# Patient Record
Sex: FEMALE | Race: WHITE | NOT HISPANIC OR LATINO | Employment: UNEMPLOYED | URBAN - METROPOLITAN AREA
[De-identification: names, ages, dates, MRNs, and addresses within clinical notes are randomized per-mention and may not be internally consistent; named-entity substitution may affect disease eponyms.]

---

## 2020-09-24 ENCOUNTER — HOSPITAL ENCOUNTER (EMERGENCY)
Facility: HOSPITAL | Age: 35
Discharge: HOME | End: 2020-09-24
Attending: EMERGENCY MEDICINE
Payer: MEDICAID

## 2020-09-24 VITALS
SYSTOLIC BLOOD PRESSURE: 105 MMHG | DIASTOLIC BLOOD PRESSURE: 72 MMHG | HEART RATE: 72 BPM | RESPIRATION RATE: 16 BRPM | TEMPERATURE: 98 F | OXYGEN SATURATION: 97 % | WEIGHT: 120 LBS

## 2020-09-24 DIAGNOSIS — T40.601A OPIATE OVERDOSE, ACCIDENTAL OR UNINTENTIONAL, INITIAL ENCOUNTER: Primary | ICD-10-CM

## 2020-09-24 LAB
ALBUMIN SERPL BCP-MCNC: 3.8 G/DL (ref 3.5–5.2)
ALP SERPL-CCNC: 72 U/L (ref 55–135)
ALT SERPL W/O P-5'-P-CCNC: 13 U/L (ref 10–44)
AMORPH CRY URNS QL MICRO: NORMAL
ANION GAP SERPL CALC-SCNC: 11 MMOL/L (ref 8–16)
AST SERPL-CCNC: 28 U/L (ref 10–40)
BACTERIA #/AREA URNS HPF: NORMAL /HPF
BASOPHILS # BLD AUTO: 0.04 K/UL (ref 0–0.2)
BASOPHILS NFR BLD: 0.7 % (ref 0–1.9)
BILIRUB SERPL-MCNC: 0.4 MG/DL (ref 0.1–1)
BILIRUB UR QL STRIP: NEGATIVE
BUN SERPL-MCNC: 9 MG/DL (ref 6–20)
CALCIUM SERPL-MCNC: 8.7 MG/DL (ref 8.7–10.5)
CHLORIDE SERPL-SCNC: 105 MMOL/L (ref 95–110)
CLARITY UR: CLEAR
CO2 SERPL-SCNC: 25 MMOL/L (ref 23–29)
COLOR UR: ABNORMAL
CREAT SERPL-MCNC: 0.8 MG/DL (ref 0.5–1.4)
DIFFERENTIAL METHOD: NORMAL
EOSINOPHIL # BLD AUTO: 0.3 K/UL (ref 0–0.5)
EOSINOPHIL NFR BLD: 4.2 % (ref 0–8)
ERYTHROCYTE [DISTWIDTH] IN BLOOD BY AUTOMATED COUNT: 13.7 % (ref 11.5–14.5)
EST. GFR  (AFRICAN AMERICAN): >60 ML/MIN/1.73 M^2
EST. GFR  (NON AFRICAN AMERICAN): >60 ML/MIN/1.73 M^2
GLUCOSE SERPL-MCNC: 68 MG/DL (ref 70–110)
GLUCOSE SERPL-MCNC: 72 MG/DL (ref 70–110)
GLUCOSE UR QL STRIP: ABNORMAL
HCT VFR BLD AUTO: 38.2 % (ref 37–48.5)
HGB BLD-MCNC: 12.3 G/DL (ref 12–16)
HGB UR QL STRIP: ABNORMAL
IMM GRANULOCYTES # BLD AUTO: 0.03 K/UL (ref 0–0.04)
IMM GRANULOCYTES NFR BLD AUTO: 0.5 % (ref 0–0.5)
KETONES UR QL STRIP: NEGATIVE
LEUKOCYTE ESTERASE UR QL STRIP: NEGATIVE
LYMPHOCYTES # BLD AUTO: 2.6 K/UL (ref 1–4.8)
LYMPHOCYTES NFR BLD: 43.4 % (ref 18–48)
MCH RBC QN AUTO: 29.6 PG (ref 27–31)
MCHC RBC AUTO-ENTMCNC: 32.2 G/DL (ref 32–36)
MCV RBC AUTO: 92 FL (ref 82–98)
MICROSCOPIC COMMENT: NORMAL
MONOCYTES # BLD AUTO: 0.4 K/UL (ref 0.3–1)
MONOCYTES NFR BLD: 6.9 % (ref 4–15)
NEUTROPHILS # BLD AUTO: 2.6 K/UL (ref 1.8–7.7)
NEUTROPHILS NFR BLD: 44.3 % (ref 38–73)
NITRITE UR QL STRIP: NEGATIVE
NRBC BLD-RTO: 0 /100 WBC
PH UR STRIP: 6 [PH] (ref 5–8)
PLATELET # BLD AUTO: 258 K/UL (ref 150–350)
PMV BLD AUTO: 10.5 FL (ref 9.2–12.9)
POCT GLUCOSE: 68 MG/DL (ref 70–110)
POTASSIUM SERPL-SCNC: 3.8 MMOL/L (ref 3.5–5.1)
PROT SERPL-MCNC: 7.1 G/DL (ref 6–8.4)
PROT UR QL STRIP: NEGATIVE
RBC # BLD AUTO: 4.16 M/UL (ref 4–5.4)
RBC #/AREA URNS HPF: 2 /HPF (ref 0–4)
SODIUM SERPL-SCNC: 141 MMOL/L (ref 136–145)
SP GR UR STRIP: 1 (ref 1–1.03)
SQUAMOUS #/AREA URNS HPF: NORMAL /HPF
URN SPEC COLLECT METH UR: ABNORMAL
UROBILINOGEN UR STRIP-ACNC: NEGATIVE EU/DL
WBC # BLD AUTO: 5.92 K/UL (ref 3.9–12.7)
WBC #/AREA URNS HPF: 2 /HPF (ref 0–5)

## 2020-09-24 PROCEDURE — 81000 URINALYSIS NONAUTO W/SCOPE: CPT

## 2020-09-24 PROCEDURE — 99284 EMERGENCY DEPT VISIT MOD MDM: CPT | Mod: 25

## 2020-09-24 PROCEDURE — 80053 COMPREHEN METABOLIC PANEL: CPT

## 2020-09-24 PROCEDURE — 82962 GLUCOSE BLOOD TEST: CPT

## 2020-09-24 PROCEDURE — 96374 THER/PROPH/DIAG INJ IV PUSH: CPT

## 2020-09-24 PROCEDURE — 85025 COMPLETE CBC W/AUTO DIFF WBC: CPT

## 2020-09-24 PROCEDURE — 96361 HYDRATE IV INFUSION ADD-ON: CPT

## 2020-09-24 PROCEDURE — 25000003 PHARM REV CODE 250: Performed by: PHYSICIAN ASSISTANT

## 2020-09-24 RX ORDER — DEXTROSE 50 % IN WATER (D50W) INTRAVENOUS SYRINGE
25
Status: COMPLETED | OUTPATIENT
Start: 2020-09-24 | End: 2020-09-24

## 2020-09-24 RX ORDER — NALOXONE HYDROCHLORIDE 4 MG/.1ML
SPRAY NASAL
Qty: 1 EACH | Refills: 11 | Status: SHIPPED | OUTPATIENT
Start: 2020-09-24

## 2020-09-24 RX ADMIN — SODIUM CHLORIDE 1000 ML: 0.9 INJECTION, SOLUTION INTRAVENOUS at 07:09

## 2020-09-24 RX ADMIN — DEXTROSE MONOHYDRATE 25 G: 25 INJECTION, SOLUTION INTRAVENOUS at 07:09

## 2020-09-25 NOTE — ED PROVIDER NOTES
Encounter Date: 9/24/2020    SCRIBE #1 NOTE: I, Darren Valentino, am scribing for, and in the presence of,  JOSÉ LUIS Pacheco. I have scribed the following portions of the note - Other sections scribed: HPI,ROS.       History     Chief Complaint   Patient presents with    Drug Overdose     heroin OD. 2mg narcan IN, 1mg IV. Pt is alert to touch. 98%RA     34 y.o. female with past medical history of heroin use presenting via EMS after being found unresponsive today. Patient was given 2mg of narcan IN and 1mg IV. Upon arrival, pt is easily arousable. Patient states she drunk a 6 pack of beers and was on her way to buy more but does not remember what happened. Patient notes she used to use heroin and feels like she did, but is unsure if she used today. She denies fever, chills, body aches, chest pain, shortness of breath, abdominal pain, nausea, or vomiting.     The history is provided by the patient and the EMS personnel. No  was used.     Review of patient's allergies indicates:   Allergen Reactions    Aspirin      anaphasics     Sulfamethoxazole-trimethoprim      History reviewed. No pertinent past medical history.  History reviewed. No pertinent surgical history.  History reviewed. No pertinent family history.  Social History     Tobacco Use    Smoking status: Unknown If Ever Smoked   Substance Use Topics    Alcohol use: Not on file    Drug use: Not on file     Review of Systems   Constitutional: Negative for chills and fever.   HENT: Negative for congestion and sore throat.    Eyes: Negative for visual disturbance.   Respiratory: Negative for cough and shortness of breath.    Cardiovascular: Negative for chest pain.   Gastrointestinal: Negative for abdominal pain, diarrhea, nausea and vomiting.   Genitourinary: Negative for dysuria and vaginal discharge.   Musculoskeletal: Negative for myalgias.   Skin: Negative for rash.   Neurological: Negative for headaches.   Psychiatric/Behavioral:  Negative for decreased concentration.       Physical Exam     Initial Vitals   BP Pulse Resp Temp SpO2   09/24/20 1902 09/24/20 1902 09/24/20 1902 09/24/20 2212 09/24/20 1902   107/73 70 16 98.2 °F (36.8 °C) 99 %      MAP       --                Physical Exam    Nursing note and vitals reviewed.  HENT:   Head: Normocephalic and atraumatic.   Mouth/Throat: No oropharyngeal exudate.   Eyes: No scleral icterus.   Neck: Normal range of motion. Neck supple. No JVD present.   Cardiovascular: Normal rate, regular rhythm, normal heart sounds and intact distal pulses.   Pulmonary/Chest: Breath sounds normal. No respiratory distress. She has no wheezes. She has no rales.   Abdominal: Soft. Bowel sounds are normal. She exhibits no distension. There is no abdominal tenderness. There is no guarding.   Musculoskeletal: Normal range of motion. No tenderness or edema.   Neurological: She has normal strength. No cranial nerve deficit or sensory deficit.   Awake but drowsy   Skin: Skin is warm and dry. Capillary refill takes less than 2 seconds. No rash noted. No erythema. No pallor.   Psychiatric: Her behavior is normal.         ED Course   Procedures  Labs Reviewed   URINALYSIS - Abnormal; Notable for the following components:       Result Value    Glucose, UA 1+ (*)     Occult Blood UA 1+ (*)     All other components within normal limits   POCT GLUCOSE - Abnormal; Notable for the following components:    POCT Glucose 68 (*)     All other components within normal limits   COMPREHENSIVE METABOLIC PANEL   CBC W/ AUTO DIFFERENTIAL   URINALYSIS MICROSCOPIC   POCT GLUCOSE MONITORING CONTINUOUS          Imaging Results          X-Ray Chest AP Portable (Final result)  Result time 09/24/20 20:02:09    Final result by Jt Foy MD (09/24/20 20:02:09)                 Impression:      No acute cardiopulmonary process identified.      Electronically signed by: Jt Foy MD  Date:    09/24/2020  Time:    20:02              Narrative:    EXAMINATION:  XR CHEST AP PORTABLE    CLINICAL HISTORY:  overdose;    TECHNIQUE:  Single frontal view of the chest was performed.    COMPARISON:  None    FINDINGS:  Cardiac silhouette is normal in size.  Lungs are symmetrically expanded.  No evidence of focal consolidative process, pneumothorax, or significant pleural effusion.  No acute osseous abnormality identified.                                            Scribe Attestation:   Scribe #1: I performed the above scribed service and the documentation accurately describes the services I performed. I attest to the accuracy of the note.    I attest that I personally performed the services documented by the scribe and acknowledged and confirm the content of the note.   Nurses notes were reviewed.  Deon Colindres              ED Course as of Sep 25 0005   Thu Sep 24, 2020   2058 Chest x-ray normal    []   2124 CBC is normal    []      ED Course User Index  [MH] Deon Colindres MD            Clinical Impression:     ICD-10-CM ICD-9-CM   1. Opiate overdose, accidental or unintentional, initial encounter  T40.601A 965.00     E850.2                          ED Disposition Condition    Discharge Stable        ED Prescriptions     Medication Sig Dispense Start Date End Date Auth. Provider    naloxone (NARCAN) 4 mg/actuation Spry 4mg by nasal route as needed for opioid overdose; may repeat every 2-3 minutes in alternating nostrils until medical help arrives. Call 911 1 each 9/24/2020  Deon Colindres MD        Follow-up Information     Follow up With Specialties Details Why Contact Info    Clara Gonzales NP Family Medicine Schedule an appointment as soon as possible for a visit in 1 week  [address] [facility]  [place] [id number] [phone]      [facility] Emergency Medicine Go to  If symptoms worsen [address] [facility] [facility] [address] [phone]                        I attest that I personally performed the  services documented by the scribe and acknowledged and confirm the content of the note.   Nurses notes were reviewed.  Deon Colindres MD  09/24/20 2100       Deon Colindres MD  09/25/20 0005       Deon Colindres MD  10/01/20 0808

## 2022-05-03 ENCOUNTER — HOSPITAL ENCOUNTER (EMERGENCY)
Facility: HOSPITAL | Age: 37
Discharge: HOME | End: 2022-05-03
Attending: EMERGENCY MEDICINE
Payer: MEDICAID

## 2022-05-03 VITALS
TEMPERATURE: 99 F | WEIGHT: 118 LBS | HEIGHT: 69 IN | OXYGEN SATURATION: 99 % | SYSTOLIC BLOOD PRESSURE: 113 MMHG | HEART RATE: 100 BPM | RESPIRATION RATE: 17 BRPM | DIASTOLIC BLOOD PRESSURE: 83 MMHG | BODY MASS INDEX: 17.48 KG/M2

## 2022-05-03 DIAGNOSIS — N73.0 PID (ACUTE PELVIC INFLAMMATORY DISEASE): Primary | ICD-10-CM

## 2022-05-03 DIAGNOSIS — R10.2 PELVIC PAIN: ICD-10-CM

## 2022-05-03 LAB
B-HCG UR QL: NEGATIVE
BACTERIA #/AREA URNS HPF: ABNORMAL /HPF
BACTERIA GENITAL QL WET PREP: ABNORMAL
BILIRUB UR QL STRIP: NEGATIVE
CLARITY UR: ABNORMAL
CLUE CELLS VAG QL WET PREP: ABNORMAL
COLOR UR: YELLOW
CTP QC/QA: YES
FILAMENT FUNGI VAG WET PREP-#/AREA: ABNORMAL
GLUCOSE UR QL STRIP: NEGATIVE
HGB UR QL STRIP: ABNORMAL
KETONES UR QL STRIP: NEGATIVE
LEUKOCYTE ESTERASE UR QL STRIP: ABNORMAL
MICROSCOPIC COMMENT: ABNORMAL
NITRITE UR QL STRIP: POSITIVE
PH UR STRIP: 6 [PH] (ref 5–8)
PROT UR QL STRIP: ABNORMAL
RBC #/AREA URNS HPF: 4 /HPF (ref 0–4)
SP GR UR STRIP: 1.02 (ref 1–1.03)
SPECIMEN SOURCE: ABNORMAL
SQUAMOUS #/AREA URNS HPF: 9 /HPF
T VAGINALIS GENITAL QL WET PREP: ABNORMAL
URN SPEC COLLECT METH UR: ABNORMAL
UROBILINOGEN UR STRIP-ACNC: NEGATIVE EU/DL
WBC #/AREA URNS HPF: 12 /HPF (ref 0–5)
WBC #/AREA VAG WET PREP: ABNORMAL
YEAST GENITAL QL WET PREP: ABNORMAL

## 2022-05-03 PROCEDURE — 96372 THER/PROPH/DIAG INJ SC/IM: CPT | Performed by: PHYSICIAN ASSISTANT

## 2022-05-03 PROCEDURE — 63600175 PHARM REV CODE 636 W HCPCS: Performed by: PHYSICIAN ASSISTANT

## 2022-05-03 PROCEDURE — 87186 SC STD MICRODIL/AGAR DIL: CPT | Performed by: PHYSICIAN ASSISTANT

## 2022-05-03 PROCEDURE — 81000 URINALYSIS NONAUTO W/SCOPE: CPT | Performed by: PHYSICIAN ASSISTANT

## 2022-05-03 PROCEDURE — 87088 URINE BACTERIA CULTURE: CPT | Performed by: PHYSICIAN ASSISTANT

## 2022-05-03 PROCEDURE — 87210 SMEAR WET MOUNT SALINE/INK: CPT | Performed by: PHYSICIAN ASSISTANT

## 2022-05-03 PROCEDURE — 99284 EMERGENCY DEPT VISIT MOD MDM: CPT | Mod: 25

## 2022-05-03 PROCEDURE — 87086 URINE CULTURE/COLONY COUNT: CPT | Performed by: PHYSICIAN ASSISTANT

## 2022-05-03 PROCEDURE — 87591 N.GONORRHOEAE DNA AMP PROB: CPT | Performed by: PHYSICIAN ASSISTANT

## 2022-05-03 PROCEDURE — 87077 CULTURE AEROBIC IDENTIFY: CPT | Performed by: PHYSICIAN ASSISTANT

## 2022-05-03 PROCEDURE — 87491 CHLMYD TRACH DNA AMP PROBE: CPT | Performed by: PHYSICIAN ASSISTANT

## 2022-05-03 PROCEDURE — 81025 URINE PREGNANCY TEST: CPT | Performed by: PHYSICIAN ASSISTANT

## 2022-05-03 RX ORDER — METRONIDAZOLE 500 MG/1
500 TABLET ORAL 3 TIMES DAILY
Qty: 21 TABLET | Refills: 0 | Status: SHIPPED | OUTPATIENT
Start: 2022-05-03 | End: 2022-05-10

## 2022-05-03 RX ORDER — DOXYCYCLINE 100 MG/1
100 CAPSULE ORAL 2 TIMES DAILY
Qty: 20 CAPSULE | Refills: 0 | Status: SHIPPED | OUTPATIENT
Start: 2022-05-03 | End: 2022-05-13

## 2022-05-03 RX ORDER — CEFTRIAXONE 1 G/1
1 INJECTION, POWDER, FOR SOLUTION INTRAMUSCULAR; INTRAVENOUS
Status: COMPLETED | OUTPATIENT
Start: 2022-05-03 | End: 2022-05-03

## 2022-05-03 RX ADMIN — CEFTRIAXONE 1 G: 1 INJECTION, POWDER, FOR SOLUTION INTRAMUSCULAR; INTRAVENOUS at 05:05

## 2022-05-03 NOTE — DISCHARGE INSTRUCTIONS
Do not drink while taking medication as it will make you sick.  Please eat a full meal with medications.  Do not engage in sexual activity until you have completed all antibiotics and you have received results.  Please follow-up with your primary care doctor or the 1 provided for you.  Return to the emergency department for any change or concerning symptoms.

## 2022-05-03 NOTE — ED PROVIDER NOTES
"Encounter Date: 5/3/2022    SCRIBE #1 NOTE: I, Beau Duncan, am scribing for, and in the presence of,  Breann Henriquez PA-C. I have scribed the following portions of the note - Other sections scribed: HPI & ROS.       History     Chief Complaint   Patient presents with    Pelvic Pain     Pt reports to the ED with C/O pelvic pain x 1 week. Pt also reports heavy vaginal bleeding x 5 days ago. Pt describes the pain as shocking. Pt reports pain with bowel movements as well. Pt denies any vaginal bleeding at this time. Pt AAOx4, RESP easy and unlabored. Pt placed  in QT bed.      Ester Salomon is a 36 y.o. female, with no pertinent PMHx, who presents to the ED for evaluation of 10/10 "sharp" pelvic pain and heavy vaginal bleeding that began 5 days ago. Patient states that she was on her menstrual cycle from 4/21 to 4/26 and that on 4/28 she felt a "gushing sensation" upon sitting. Recounts bleeding heavily and seeing blood clots in her urine. Also complains of "twisting" abdominal pain and pain when urinating and when passing bowel movements. Though states she has had regular bowel movements. Additionally confirms SOB and sleep disturbance associated with pelvic pain. States that she has never experienced these symptoms before but thinks it is most likely an STI transmitted from her boyfriend who was recently treated for a bacterial infection. 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 No other exacerbating or alleviating factors. Patient denies nausea or vomiting after the first day of symptoms. Denies fever, constipation or other associated symptoms.       The history is provided by the patient. No  was used.     Review of patient's allergies indicates:   Allergen Reactions    Aspirin      anaphasics     Sulfamethoxazole-trimethoprim      History reviewed. No pertinent past medical " history.  History reviewed. No pertinent surgical history.  History reviewed. No pertinent family history.  Social History     Tobacco Use    Smoking status: Unknown If Ever Smoked     Review of Systems   Constitutional: Negative for fever.   HENT: Negative for congestion.    Respiratory: Positive for shortness of breath.    Cardiovascular: Negative for chest pain.   Gastrointestinal: Negative for constipation, nausea and vomiting.   Genitourinary: Positive for pelvic pain and vaginal bleeding.        (+) pain when urinating and passing stool   Musculoskeletal: Negative for myalgias.   Skin: Negative for rash.   Neurological: Negative for headaches.   Psychiatric/Behavioral: Positive for sleep disturbance.   All other systems reviewed and are negative.      Physical Exam     Initial Vitals [05/03/22 1526]   BP Pulse Resp Temp SpO2   110/83 105 16 99.2 °F (37.3 °C) 99 %      MAP       --         Physical Exam    Nursing note and vitals reviewed.  Constitutional: She appears well-developed and well-nourished. She appears distressed (She is tearful and crying).   HENT:   Head: Normocephalic and atraumatic.   Mouth/Throat: Oropharynx is clear and moist.   Eyes: EOM are normal. Pupils are equal, round, and reactive to light.   Neck: Neck supple.   Normal range of motion.  Cardiovascular: Normal rate and regular rhythm.   Pulmonary/Chest: Breath sounds normal. No respiratory distress.   Abdominal: Abdomen is soft. Bowel sounds are normal. She exhibits no distension. There is no abdominal tenderness. There is no rebound.   Genitourinary:    Genitourinary Comments: There is a light brown discharge in the vaginal vault.  There is moderate cervical motion tenderness to palpation.  There is no adnexal tenderness to palpation.  No external genital lesions noted on exam.     Musculoskeletal:         General: No edema. Normal range of motion.      Cervical back: Normal range of motion and neck supple.     Neurological: She is  alert and oriented to person, place, and time.   Skin: Skin is warm. Capillary refill takes less than 2 seconds. No erythema.         ED Course   Procedures  Labs Reviewed   VAGINAL SCREEN - Abnormal; Notable for the following components:       Result Value    Clue Cells Moderate (*)     WBC - Vaginal Screen Moderate (*)     Bacteria - Vaginal Screen Moderate (*)     All other components within normal limits    Narrative:     Release to patient->Immediate   URINALYSIS, REFLEX TO URINE CULTURE - Abnormal; Notable for the following components:    Appearance, UA Hazy (*)     Protein, UA Trace (*)     Occult Blood UA 2+ (*)     Nitrite, UA Positive (*)     Leukocytes, UA 1+ (*)     All other components within normal limits    Narrative:     Specimen Source->Urine   URINALYSIS MICROSCOPIC - Abnormal; Notable for the following components:    WBC, UA 12 (*)     Bacteria Many (*)     All other components within normal limits    Narrative:     Specimen Source->Urine   C. TRACHOMATIS/N. GONORRHOEAE BY AMP DNA   CULTURE, URINE   POCT URINE PREGNANCY          Imaging Results    None          Medications   cefTRIAXone injection 1 g (1 g Intramuscular Given 5/3/22 1706)     Medical Decision Making:   History:   Old Medical Records: I decided to obtain old medical records.  Clinical Tests:   Lab Tests: Ordered and Reviewed  Medical Tests: Ordered and Reviewed       APC / Resident Notes:   This is an urgent evaluation of a 36-year-old female who presents to the emergency department complaining of vaginal discharge and lower abdominal pain.  She states that she found out her boyfriend has been cheating on her with other men.    The patient is currently afebrile and nontoxic in appearance.  Vital signs are stable.  Physical exam reveals mild suprapubic abdominal tenderness.  On pelvic exam, there is a discharge noted in the vaginal vault was significant cervical motion tenderness.  No adnexal tenderness to palpation.  No external  genital lesions noted.  A genprobe for gonorrhea and chlamydia was sent and is pending.  Wet prep revealed moderate clue cells, moderate white blood cells and moderate bacteria.  Urine pregnancy test is negative.  Urinalysis reveals a nitrite positive urine with multiple leukocytes.  I believe these urinary results could be coming from a vaginal source.  Believe she has pelvic inflammatory disease which I will treat with doxycycline and Rocephin.  Rocephin was given in the emergency department.  I sent her home with a prescription of doxycycline while her urine culture is pending.  Safe sex precautions were reviewed with her.  Also, return precautions were also reviewed.  She verbalized understanding and agreement.  I carefully considered but doubt ectopic pregnancy, ovarian torsion, tubo-ovarian abscess.  I believe she is currently safe and stable for discharge at this time.     Scribe Attestation:   Scribe #1: I performed the above scribed service and the documentation accurately describes the services I performed. I attest to the accuracy of the note.                 Clinical Impression:   Final diagnoses:  [N73.0] PID (acute pelvic inflammatory disease) (Primary)  [R10.2] Pelvic pain       I, Breann Henriquez PA-C, personally performed the services described in this documentation. All medical record entries made by the scribe were at my direction and in my presence. I have reviewed the chart and agree that the record reflects my personal performance and is accurate and complete.     ED Disposition Condition    Discharge Stable        ED Prescriptions     Medication Sig Dispense Start Date End Date Auth. Provider    doxycycline (VIBRAMYCIN) 100 MG Cap Take 1 capsule (100 mg total) by mouth 2 (two) times daily. for 10 days 20 capsule 5/3/2022 5/13/2022 Breann Henriquez PA-C    metroNIDAZOLE (FLAGYL) 500 MG tablet Take 1 tablet (500 mg total) by mouth 3 (three) times daily. for 7 days 21 tablet 5/3/2022 5/10/2022  Breann Henriquez PA-C        Follow-up Information     Follow up With Specialties Details Why Contact Info    Matias Huang MD Internal Medicine   [address] [facility] [address]  [phone]             Breann Henriquez PA-C  05/03/22 7292

## 2022-05-03 NOTE — ED NOTES
Pt with c/o pelvic pain that she states started a couple days ago. Pt denies vaginal bleeding at this time and states that it stopped. Per pt her fiance has been found having sex with men and she is now worried due to pelvic pain as well as weight loss. Pt with no other complaints.

## 2022-05-04 LAB
C TRACH DNA SPEC QL NAA+PROBE: NOT DETECTED
N GONORRHOEA DNA SPEC QL NAA+PROBE: NOT DETECTED

## 2022-05-05 LAB — BACTERIA UR CULT: ABNORMAL

## 2024-02-04 ENCOUNTER — HOSPITAL ENCOUNTER (EMERGENCY)
Facility: HOSPITAL | Age: 39
Discharge: HOME | End: 2024-02-04
Attending: EMERGENCY MEDICINE
Payer: MEDICAID

## 2024-02-04 VITALS
SYSTOLIC BLOOD PRESSURE: 124 MMHG | HEIGHT: 69 IN | BODY MASS INDEX: 17.43 KG/M2 | RESPIRATION RATE: 18 BRPM | HEART RATE: 89 BPM | TEMPERATURE: 98 F | DIASTOLIC BLOOD PRESSURE: 80 MMHG | OXYGEN SATURATION: 99 %

## 2024-02-04 DIAGNOSIS — Z86.19 HISTORY OF HEPATITIS C: ICD-10-CM

## 2024-02-04 DIAGNOSIS — N39.0 URINARY TRACT INFECTION WITHOUT HEMATURIA, SITE UNSPECIFIED: ICD-10-CM

## 2024-02-04 DIAGNOSIS — Z20.2 STD EXPOSURE: Primary | ICD-10-CM

## 2024-02-04 LAB
B-HCG UR QL: NEGATIVE
BACTERIA #/AREA URNS HPF: ABNORMAL /HPF
BACTERIA GENITAL QL WET PREP: ABNORMAL
BILIRUB UR QL STRIP: NEGATIVE
CLARITY UR: ABNORMAL
CLUE CELLS VAG QL WET PREP: ABNORMAL
COLOR UR: YELLOW
CTP QC/QA: YES
FILAMENT FUNGI VAG WET PREP-#/AREA: ABNORMAL
GLUCOSE UR QL STRIP: NEGATIVE
HGB UR QL STRIP: NEGATIVE
HIV1+2 IGG SERPL QL IA.RAPID: NORMAL
KETONES UR QL STRIP: NEGATIVE
LEUKOCYTE ESTERASE UR QL STRIP: ABNORMAL
MICROSCOPIC COMMENT: ABNORMAL
NITRITE UR QL STRIP: NEGATIVE
PH UR STRIP: 5 [PH] (ref 5–8)
PROT UR QL STRIP: NEGATIVE
RBC #/AREA URNS HPF: 5 /HPF (ref 0–4)
SP GR UR STRIP: 1.02 (ref 1–1.03)
SPECIMEN SOURCE: ABNORMAL
SQUAMOUS #/AREA URNS HPF: 18 /HPF
T VAGINALIS GENITAL QL WET PREP: ABNORMAL
URN SPEC COLLECT METH UR: ABNORMAL
UROBILINOGEN UR STRIP-ACNC: NEGATIVE EU/DL
WBC #/AREA URNS HPF: 21 /HPF (ref 0–5)
WBC #/AREA VAG WET PREP: ABNORMAL
YEAST GENITAL QL WET PREP: ABNORMAL

## 2024-02-04 PROCEDURE — 86703 HIV-1/HIV-2 1 RESULT ANTBDY: CPT | Performed by: PHYSICIAN ASSISTANT

## 2024-02-04 PROCEDURE — 87491 CHLMYD TRACH DNA AMP PROBE: CPT

## 2024-02-04 PROCEDURE — 87088 URINE BACTERIA CULTURE: CPT

## 2024-02-04 PROCEDURE — 81000 URINALYSIS NONAUTO W/SCOPE: CPT

## 2024-02-04 PROCEDURE — 87086 URINE CULTURE/COLONY COUNT: CPT

## 2024-02-04 PROCEDURE — 99283 EMERGENCY DEPT VISIT LOW MDM: CPT

## 2024-02-04 PROCEDURE — 87186 SC STD MICRODIL/AGAR DIL: CPT

## 2024-02-04 PROCEDURE — 87077 CULTURE AEROBIC IDENTIFY: CPT

## 2024-02-04 PROCEDURE — 87210 SMEAR WET MOUNT SALINE/INK: CPT | Performed by: PHYSICIAN ASSISTANT

## 2024-02-04 PROCEDURE — 81025 URINE PREGNANCY TEST: CPT

## 2024-02-04 RX ORDER — CEPHALEXIN 500 MG/1
500 CAPSULE ORAL EVERY 12 HOURS
Qty: 10 CAPSULE | Refills: 0 | Status: SHIPPED | OUTPATIENT
Start: 2024-02-04 | End: 2024-02-09

## 2024-02-04 NOTE — DISCHARGE INSTRUCTIONS
I have placed a referral to the hepatitis C Clinic, they will contact you to schedule an appointment.  If you do not hear from them, please reach out to your primary care doctor to receive an additional referral.  If you do not have a primary care physician, please call the [facility] clinic listed below to schedule an appointment with a provider.
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
## 2024-02-04 NOTE — ED NOTES
"Pt. Reports she would like to leave because she has been in the ED for a " while" and has to go to work. Pt. Reports she can review her results in my chart. Provider made aware that pt. Would like to leave.  "

## 2024-02-06 LAB — BACTERIA UR CULT: ABNORMAL

## 2024-02-08 ENCOUNTER — TELEPHONE (OUTPATIENT)
Dept: HEPATOLOGY | Facility: CLINIC | Age: 39
End: 2024-02-08
Payer: MEDICAID

## 2024-02-08 LAB
C TRACH DNA SPEC QL NAA+PROBE: NOT DETECTED
N GONORRHOEA DNA SPEC QL NAA+PROBE: DETECTED

## 2024-02-08 NOTE — TELEPHONE ENCOUNTER
Randa Molina PA-C ordered that patient be scheduled for a hepatology consult visit for hep c.  See Care Everywhere.  Patient hep c quant positive.  I spoke with patient.  Appt with PA Scheuermann scheduled 2/16/24.

## 2024-02-09 ENCOUNTER — TELEPHONE (OUTPATIENT)
Dept: EMERGENCY MEDICINE | Facility: HOSPITAL | Age: 39
End: 2024-02-09
Payer: MEDICAID

## 2024-02-09 RX ORDER — CEFIXIME 400 MG/1
400 CAPSULE ORAL ONCE
Qty: 1 CAPSULE | Refills: 0 | Status: SHIPPED | OUTPATIENT
Start: 2024-02-09 | End: 2024-02-09

## 2024-02-09 RX ORDER — DOXYCYCLINE 100 MG/1
100 CAPSULE ORAL 2 TIMES DAILY
Qty: 14 CAPSULE | Refills: 0 | Status: SHIPPED | OUTPATIENT
Start: 2024-02-09 | End: 2024-02-16

## 2024-02-16 ENCOUNTER — TELEPHONE (OUTPATIENT)
Dept: HEPATOLOGY | Facility: CLINIC | Age: 39
End: 2024-02-16
Payer: MEDICAID

## 2024-02-16 NOTE — TELEPHONE ENCOUNTER
Patient was a no-show for scheduled appt with PA Scheuermann on 2/16/24.  I spoke with her.  She reports going to the wrong [facility] location.  Patient scheduled again to see provider on 3/7/24; reminder notice mailed.